# Patient Record
Sex: FEMALE
[De-identification: names, ages, dates, MRNs, and addresses within clinical notes are randomized per-mention and may not be internally consistent; named-entity substitution may affect disease eponyms.]

---

## 2019-10-03 PROBLEM — Z00.00 ENCOUNTER FOR PREVENTIVE HEALTH EXAMINATION: Status: ACTIVE | Noted: 2019-10-03

## 2019-10-10 ENCOUNTER — APPOINTMENT (OUTPATIENT)
Dept: NEUROLOGY | Facility: CLINIC | Age: 54
End: 2019-10-10
Payer: MEDICAID

## 2019-10-10 VITALS
TEMPERATURE: 98.3 F | WEIGHT: 172 LBS | OXYGEN SATURATION: 98 % | DIASTOLIC BLOOD PRESSURE: 86 MMHG | SYSTOLIC BLOOD PRESSURE: 133 MMHG | HEIGHT: 62 IN | HEART RATE: 71 BPM | BODY MASS INDEX: 31.65 KG/M2

## 2019-10-10 DIAGNOSIS — M47.27 OTHER SPONDYLOSIS WITH RADICULOPATHY, LUMBOSACRAL REGION: ICD-10-CM

## 2019-10-10 DIAGNOSIS — M54.40 LUMBAGO WITH SCIATICA, UNSPECIFIED SIDE: ICD-10-CM

## 2019-10-10 PROCEDURE — 99204 OFFICE O/P NEW MOD 45 MIN: CPT

## 2019-10-10 RX ORDER — MELOXICAM 15 MG/1
15 TABLET ORAL
Qty: 30 | Refills: 3 | Status: ACTIVE | COMMUNITY
Start: 2019-10-10 | End: 1900-01-01

## 2019-10-10 NOTE — DATA REVIEWED
[de-identified] : Principal : DOC SANFORD N\par IMPRESSION: 1. MILD TO MODERATE LEVOSCOLIOSIS, LUMBAR SPINE. 2. MILD BILATERAL NONCOMPRESSIVE FACET ARTHROPATHY, L1-2, L2-3 AND L3-4. 3. TINY LEFT LATERAL AND PROXIMAL FORAMINAL DISC HERNIATION, L4-5, WITH MILD BILATERAL FACET ARTHROPATHY. THERE IS VERY MILD DEFORMITY OF THE LEFT L5 ROOT ORIGIN. 4. MILD TO MODERATE CENTRAL, LEFT LATERAL AND FORAMINAL DISC HERNIATION, L5-S1, WITH NODULAR INFERIOR EXTENSION, BONY PRODUCTIVE CHANGE AND MILD TO MODERATE FACET ARTHROPATHY. THIS PROJECTS MAINLY INTO THE EPIDURAL FAT. THERE IS MODERATE COMPRESSION OF THE EXITING LEFT L5 ROOT AND THE ORIGIN/DESCENDING LEFT S1 ROOT, WITHOUT SIGNIFICANT THECAL SAC DEFORMITY. \par  \par  MRI LUMBAR SPINE: \par  TECHNIQUE: Sagittal: T1, STIR, T2.  Axial: T1, T2 (disc cuts L1-S1). Coronal: T2. \par  CLINICAL HISTORY: Low back pain, left leg pain. \par  FINDINGS: No prior exams are available for comparison. The lumbar spinal canal is normal in size and configuration. No acute fracture is seen. No intradural abnormalities, bony destructive lesions or paraspinal masses are noted. The distal thoracic spinal cord and conus are normal. No bony or neural anomalies are identified. A 2.5 x 2 cm ovoid right inferior/posterior hepatic T2 hyperintense lesion is partially visualized, likely a benign cyst or hemangioma. Correlation with ultrasound is advised. A mild to moderate levoscoliosis of the lumbar spine is seen, with straightening of the normal lordosis. \par  L1-2, L2-3: Mild bilateral noncompressive facet arthropathy is noted at both levels. The posterior disc margins are normal. Early disc degeneration is seen at both levels, with mild anterior bony productive change. L3-4: Mild bilateral noncompressive facet arthropathy is noted. The posterior disc margin is normal. L4-5: A tiny left lateral and proximal foraminal disc herniation is noted, with mild bilateral facet arthropathy. There is very mild deformity of the left L5 root origin. L5-S1: A mild to moderate central, left lateral and foraminal disc herniation is noted, with bony productive change and focal nodular inferior extension. This projects mainly into the epidural fat. There is moderate compression of the exiting left L5 root and the origin/descending left S1 root. No significant thecal sac deformity is seen. Mild degenerative disc narrowing is demonstrated, more left-sided, with minimal reverse spondylolisthesis and chronic fatty deposition in the subcortical marrow. \par  Electronic Signature: I personally reviewed the images and agree with this report. Final Report: Dictated by  and Signed by Attending Doc Sanford MD 9/11/2019 1:50 PM

## 2019-10-10 NOTE — HISTORY OF PRESENT ILLNESS
[FreeTextEntry1] : Four years it started without trauma as a low back ache and waist pain worsened by bending walking standing. 4weeks ago it became worse particularly in the morning before the diclofenac  is taken limiting her effort tolerance 1 mile.

## 2019-10-10 NOTE — PHYSICAL EXAM
[FreeTextEntry1] : Alert, awake, oriented in time, place and person with normal memory and language (fluent speech, normal repetition, comprehension and naming). Pupils are equal and reactive to light and accommodation. Fundus discs and retina are normal. Visual Fields are full upon confrontation testing. Extraocular movements are full in all gaze directions without nystagmus. Optokinetic nystagmus is normal with tape running  either direction. Facial sensations, jaw strength, facial movements, hearing, palate, neck, shoulder and tongue are normal and symmetrical. Neck is supple. Tone, bulk, strength,, in the extremities are normal. Sensations for touch, pin and vibration are normal. Finger-to-nose and heel-to-shin are normal. Rapid alternating movements are normal. There is no tremor. Romberg is negative. Tandem gait, heel-walking and toe-walking are normal.\par SLRT negaitive. Right AJ sluggish all else normal

## 2019-10-31 ENCOUNTER — APPOINTMENT (OUTPATIENT)
Dept: NEUROLOGY | Facility: CLINIC | Age: 54
End: 2019-10-31
Payer: MEDICAID

## 2019-10-31 VITALS
DIASTOLIC BLOOD PRESSURE: 92 MMHG | BODY MASS INDEX: 31.65 KG/M2 | HEIGHT: 62 IN | OXYGEN SATURATION: 98 % | SYSTOLIC BLOOD PRESSURE: 139 MMHG | HEART RATE: 71 BPM | WEIGHT: 172 LBS

## 2019-10-31 PROCEDURE — 95885 MUSC TST DONE W/NERV TST LIM: CPT

## 2019-10-31 PROCEDURE — 95908 NRV CNDJ TST 3-4 STUDIES: CPT

## 2019-10-31 PROCEDURE — 99213 OFFICE O/P EST LOW 20 MIN: CPT | Mod: 25

## 2019-10-31 NOTE — DATA REVIEWED
[de-identified] : Principal : DOC SANFORD N\par IMPRESSION: 1. MILD TO MODERATE LEVOSCOLIOSIS, LUMBAR SPINE. 2. MILD BILATERAL NONCOMPRESSIVE FACET ARTHROPATHY, L1-2, L2-3 AND L3-4. 3. TINY LEFT LATERAL AND PROXIMAL FORAMINAL DISC HERNIATION, L4-5, WITH MILD BILATERAL FACET ARTHROPATHY. THERE IS VERY MILD DEFORMITY OF THE LEFT L5 ROOT ORIGIN. 4. MILD TO MODERATE CENTRAL, LEFT LATERAL AND FORAMINAL DISC HERNIATION, L5-S1, WITH NODULAR INFERIOR EXTENSION, BONY PRODUCTIVE CHANGE AND MILD TO MODERATE FACET ARTHROPATHY. THIS PROJECTS MAINLY INTO THE EPIDURAL FAT. THERE IS MODERATE COMPRESSION OF THE EXITING LEFT L5 ROOT AND THE ORIGIN/DESCENDING LEFT S1 ROOT, WITHOUT SIGNIFICANT THECAL SAC DEFORMITY. \par  \par  MRI LUMBAR SPINE: \par  TECHNIQUE: Sagittal: T1, STIR, T2.  Axial: T1, T2 (disc cuts L1-S1). Coronal: T2. \par  CLINICAL HISTORY: Low back pain, left leg pain. \par  FINDINGS: No prior exams are available for comparison. The lumbar spinal canal is normal in size and configuration. No acute fracture is seen. No intradural abnormalities, bony destructive lesions or paraspinal masses are noted. The distal thoracic spinal cord and conus are normal. No bony or neural anomalies are identified. A 2.5 x 2 cm ovoid right inferior/posterior hepatic T2 hyperintense lesion is partially visualized, likely a benign cyst or hemangioma. Correlation with ultrasound is advised. A mild to moderate levoscoliosis of the lumbar spine is seen, with straightening of the normal lordosis. \par  L1-2, L2-3: Mild bilateral noncompressive facet arthropathy is noted at both levels. The posterior disc margins are normal. Early disc degeneration is seen at both levels, with mild anterior bony productive change. L3-4: Mild bilateral noncompressive facet arthropathy is noted. The posterior disc margin is normal. L4-5: A tiny left lateral and proximal foraminal disc herniation is noted, with mild bilateral facet arthropathy. There is very mild deformity of the left L5 root origin. L5-S1: A mild to moderate central, left lateral and foraminal disc herniation is noted, with bony productive change and focal nodular inferior extension. This projects mainly into the epidural fat. There is moderate compression of the exiting left L5 root and the origin/descending left S1 root. No significant thecal sac deformity is seen. Mild degenerative disc narrowing is demonstrated, more left-sided, with minimal reverse spondylolisthesis and chronic fatty deposition in the subcortical marrow. \par  Electronic Signature: I personally reviewed the images and agree with this report. Final Report: Dictated by  and Signed by Attending Doc Sanford MD 9/11/2019 1:50 PM

## 2019-10-31 NOTE — DISCUSSION/SUMMARY
[FreeTextEntry1] : Physical therapy analgesic and rest Reviewed images of MRI - moderately large herniated L5/S1 disc. However because the pain is improved and she has few deficits if any, recommend continue PT and weight loss

## 2019-10-31 NOTE — HISTORY OF PRESENT ILLNESS
[FreeTextEntry1] : Four years it started without trauma as a low back ache and waist pain worsened by bending walking standing. 4weeks ago it became worse particularly in the morning before the diclofenac  is taken limiting her effort tolerance 1 mile. \par 10/31/2019: She has started PT and feels better

## 2019-11-14 ENCOUNTER — APPOINTMENT (OUTPATIENT)
Dept: NEUROLOGY | Facility: CLINIC | Age: 54
End: 2019-11-14

## 2020-06-16 ENCOUNTER — APPOINTMENT (OUTPATIENT)
Dept: NEUROLOGY | Facility: CLINIC | Age: 55
End: 2020-06-16

## 2020-07-07 ENCOUNTER — APPOINTMENT (OUTPATIENT)
Dept: NEUROLOGY | Facility: CLINIC | Age: 55
End: 2020-07-07
Payer: MEDICAID

## 2020-07-07 VITALS
TEMPERATURE: 98.3 F | DIASTOLIC BLOOD PRESSURE: 68 MMHG | OXYGEN SATURATION: 98 % | WEIGHT: 158 LBS | HEART RATE: 82 BPM | BODY MASS INDEX: 29.08 KG/M2 | SYSTOLIC BLOOD PRESSURE: 102 MMHG | HEIGHT: 62 IN

## 2020-07-07 DIAGNOSIS — G44.89 OTHER HEADACHE SYNDROME: ICD-10-CM

## 2020-07-07 PROCEDURE — 99214 OFFICE O/P EST MOD 30 MIN: CPT

## 2020-07-07 RX ORDER — TOPIRAMATE 25 MG/1
25 TABLET, FILM COATED ORAL TWICE DAILY
Qty: 120 | Refills: 3 | Status: ACTIVE | COMMUNITY
Start: 2020-07-07 | End: 1900-01-01

## 2020-07-07 NOTE — DISCUSSION/SUMMARY
[FreeTextEntry1] :  Reviewed images of MRI - moderately large herniated L5/S1 disc. Discussed surgery; she does not want surgery. Advised trying to lose weight - currently at 153 lbs. IBW for 5'3" is 126 lbs (max). Recommend topiramate for headache control; continue prn meloxicam. Return for follow up after 2 months when target  is  10lbs less in weight

## 2020-07-07 NOTE — HISTORY OF PRESENT ILLNESS
[FreeTextEntry1] : Four years ago it started without trauma as a low back ache and waist pain worsened by bending walking standing. In September 2019 it became worse particularly in the morning before the diclofenac  is taken limiting her effort tolerance 1 mile. \par 10/31/2019: She felt better after PT\par 7/7/2020: Recurrence of pain worse when sitting for less than 5 minutes: walking relieves the pain

## 2020-07-07 NOTE — DATA REVIEWED
[de-identified] : Principal : DOC SANFORD N\par IMPRESSION: 1. MILD TO MODERATE LEVOSCOLIOSIS, LUMBAR SPINE. 2. MILD BILATERAL NONCOMPRESSIVE FACET ARTHROPATHY, L1-2, L2-3 AND L3-4. 3. TINY LEFT LATERAL AND PROXIMAL FORAMINAL DISC HERNIATION, L4-5, WITH MILD BILATERAL FACET ARTHROPATHY. THERE IS VERY MILD DEFORMITY OF THE LEFT L5 ROOT ORIGIN. 4. MILD TO MODERATE CENTRAL, LEFT LATERAL AND FORAMINAL DISC HERNIATION, L5-S1, WITH NODULAR INFERIOR EXTENSION, BONY PRODUCTIVE CHANGE AND MILD TO MODERATE FACET ARTHROPATHY. THIS PROJECTS MAINLY INTO THE EPIDURAL FAT. THERE IS MODERATE COMPRESSION OF THE EXITING LEFT L5 ROOT AND THE ORIGIN/DESCENDING LEFT S1 ROOT, WITHOUT SIGNIFICANT THECAL SAC DEFORMITY. \par  \par  MRI LUMBAR SPINE: \par  TECHNIQUE: Sagittal: T1, STIR, T2.  Axial: T1, T2 (disc cuts L1-S1). Coronal: T2. \par  CLINICAL HISTORY: Low back pain, left leg pain. \par  FINDINGS: No prior exams are available for comparison. The lumbar spinal canal is normal in size and configuration. No acute fracture is seen. No intradural abnormalities, bony destructive lesions or paraspinal masses are noted. The distal thoracic spinal cord and conus are normal. No bony or neural anomalies are identified. A 2.5 x 2 cm ovoid right inferior/posterior hepatic T2 hyperintense lesion is partially visualized, likely a benign cyst or hemangioma. Correlation with ultrasound is advised. A mild to moderate levoscoliosis of the lumbar spine is seen, with straightening of the normal lordosis. \par  L1-2, L2-3: Mild bilateral noncompressive facet arthropathy is noted at both levels. The posterior disc margins are normal. Early disc degeneration is seen at both levels, with mild anterior bony productive change. L3-4: Mild bilateral noncompressive facet arthropathy is noted. The posterior disc margin is normal. L4-5: A tiny left lateral and proximal foraminal disc herniation is noted, with mild bilateral facet arthropathy. There is very mild deformity of the left L5 root origin. L5-S1: A mild to moderate central, left lateral and foraminal disc herniation is noted, with bony productive change and focal nodular inferior extension. This projects mainly into the epidural fat. There is moderate compression of the exiting left L5 root and the origin/descending left S1 root. No significant thecal sac deformity is seen. Mild degenerative disc narrowing is demonstrated, more left-sided, with minimal reverse spondylolisthesis and chronic fatty deposition in the subcortical marrow. \par  Electronic Signature: I personally reviewed the images and agree with this report. Final Report: Dictated by  and Signed by Attending Doc Sanford MD 9/11/2019 1:50 PM

## 2021-04-08 ENCOUNTER — APPOINTMENT (OUTPATIENT)
Dept: NEUROSURGERY | Facility: CLINIC | Age: 56
End: 2021-04-08
Payer: MEDICAID

## 2021-04-08 VITALS
TEMPERATURE: 97.3 F | OXYGEN SATURATION: 99 % | SYSTOLIC BLOOD PRESSURE: 119 MMHG | HEART RATE: 79 BPM | BODY MASS INDEX: 29.08 KG/M2 | WEIGHT: 158 LBS | HEIGHT: 62 IN | DIASTOLIC BLOOD PRESSURE: 78 MMHG

## 2021-04-08 DIAGNOSIS — M48.00 SPINAL STENOSIS, SITE UNSPECIFIED: ICD-10-CM

## 2021-04-08 DIAGNOSIS — Q76.49 OTHER CONGENITAL MALFORMATIONS OF SPINE, NOT ASSOCIATED WITH SCOLIOSIS: ICD-10-CM

## 2021-04-08 PROCEDURE — 99203 OFFICE O/P NEW LOW 30 MIN: CPT

## 2021-04-08 PROCEDURE — 99072 ADDL SUPL MATRL&STAF TM PHE: CPT

## 2021-04-19 PROBLEM — Q76.49 SPINAL DEFORMITY: Status: ACTIVE | Noted: 2021-04-19

## 2021-04-19 PROBLEM — M48.00 SPINAL STENOSIS: Status: ACTIVE | Noted: 2021-04-19

## 2021-04-19 NOTE — HISTORY OF PRESENT ILLNESS
[de-identified] : History of back pain for several years had an injection 6 yrs ago and the pain got better. Last year, her pain got worse. She had Physical therapy but the pain only helped a little.The pain still got worse.The pain severity is 8 out of 10. It is worse in the morning. It is difficult to get out of bed in the morning. She has leg pain on the left.  She has no difficulty walking. Sitting and bending makes the pain worse.  Pain is daily and only made better but walking. She had spinal injections  but did not work.  No problems with bowel or bladder. No

## 2021-04-19 NOTE — PHYSICAL EXAM
[Straight-Leg Raise Test - Left] : straight leg raise of the left leg was negative [Straight-Leg Raise Test - Right] : straight leg raise  of the right leg was negative [Intact] : all motor groups within normal limits of strength and tone bilaterally [FreeTextEntry1] : Alert, awake, oriented in time, place and person with normal memory and language (fluent speech, normal repetition, comprehension and naming). Pupils are equal and reactive to light and accommodation. Fundus discs and retina are normal. Visual Fields are full upon confrontation testing. Extraocular movements are full in all gaze directions without nystagmus. Optokinetic nystagmus is normal with tape running  either direction. Facial sensations, jaw strength, facial movements, hearing, palate, neck, shoulder and tongue are normal and symmetrical. Neck is supple. Tone, bulk, strength,, in the extremities are normal. Sensations for touch, pin and vibration are normal. Finger-to-nose and heel-to-shin are normal. Rapid alternating movements are normal. There is no tremor. Romberg is negative. Tandem gait, heel-walking and toe-walking are normal.\par SLRT negaitive. Right AJ sluggish all else normal

## 2021-04-19 NOTE — ASSESSMENT
[FreeTextEntry1] : He has multilevel DDD and degenerative scoliosis. Before discussing surgery I want her to do more physical therapy and pain management,

## 2021-04-30 ENCOUNTER — NON-APPOINTMENT (OUTPATIENT)
Age: 56
End: 2021-04-30